# Patient Record
Sex: MALE | URBAN - METROPOLITAN AREA
[De-identification: names, ages, dates, MRNs, and addresses within clinical notes are randomized per-mention and may not be internally consistent; named-entity substitution may affect disease eponyms.]

---

## 2019-07-29 ENCOUNTER — OFFICE VISIT (OUTPATIENT)
Dept: URBAN - METROPOLITAN AREA CLINIC 11 | Facility: CLINIC | Age: 84
End: 2019-07-29
Payer: COMMERCIAL

## 2019-07-29 PROCEDURE — 99204 OFFICE O/P NEW MOD 45 MIN: CPT | Performed by: OPTOMETRIST

## 2019-07-29 ASSESSMENT — INTRAOCULAR PRESSURE
OD: 22
OS: 22

## 2019-07-29 ASSESSMENT — KERATOMETRY
OS: 43.50
OD: 43.00

## 2019-07-29 ASSESSMENT — VISUAL ACUITY
OS: 20/60
OD: 20/60

## 2019-08-05 ENCOUNTER — TESTING ONLY (OUTPATIENT)
Dept: URBAN - METROPOLITAN AREA CLINIC 45 | Facility: CLINIC | Age: 84
End: 2019-08-05
Payer: COMMERCIAL

## 2019-08-05 PROCEDURE — 92136 OPHTHALMIC BIOMETRY: CPT | Performed by: OPHTHALMOLOGY

## 2019-08-05 ASSESSMENT — PACHYMETRY
OS: 2.50
OD: 2.54
OS: 23.28
OD: 23.47

## 2019-08-06 ENCOUNTER — OFFICE VISIT (OUTPATIENT)
Dept: URBAN - METROPOLITAN AREA CLINIC 45 | Facility: CLINIC | Age: 84
End: 2019-08-06
Payer: COMMERCIAL

## 2019-08-06 DIAGNOSIS — H25.13 AGE-RELATED NUCLEAR CATARACT, BILATERAL: Primary | ICD-10-CM

## 2019-08-06 PROCEDURE — 76519 ECHO EXAM OF EYE: CPT | Performed by: OPHTHALMOLOGY

## 2019-08-06 PROCEDURE — 92004 COMPRE OPH EXAM NEW PT 1/>: CPT | Performed by: OPHTHALMOLOGY

## 2019-08-06 RX ORDER — OFLOXACIN 3 MG/ML
0.3 % SOLUTION/ DROPS OPHTHALMIC
Qty: 5 | Refills: 1 | Status: INACTIVE
Start: 2019-08-06 | End: 2019-08-13

## 2019-08-06 ASSESSMENT — INTRAOCULAR PRESSURE
OD: 21
OS: 21

## 2019-08-06 NOTE — IMPRESSION/PLAN
Impression: Age-related nuclear cataract, bilateral: H25.13. Plan: OK for ce/iol OD then OS in [place] 27, RL2. Distance target. Discussed diagnosis of cataracts. Cataracts are limiting vision. Discussed risks, benefits and alternatives to surgery including but not limited to: bleeding, infection, risk of vision loss, loss of the eye, need for other surgery. Patient voiced understanding and wishes to proceed.

## 2019-08-12 ENCOUNTER — SURGERY (OUTPATIENT)
Dept: URBAN - METROPOLITAN AREA SURGERY 20 | Facility: SURGERY | Age: 84
End: 2019-08-12
Payer: COMMERCIAL

## 2019-08-12 PROCEDURE — 66984 XCAPSL CTRC RMVL W/O ECP: CPT | Performed by: OPHTHALMOLOGY

## 2019-08-13 ENCOUNTER — POST-OPERATIVE VISIT (OUTPATIENT)
Dept: URBAN - METROPOLITAN AREA CLINIC 11 | Facility: CLINIC | Age: 84
End: 2019-08-13

## 2019-08-13 PROCEDURE — 99024 POSTOP FOLLOW-UP VISIT: CPT | Performed by: OPTOMETRIST

## 2019-08-13 RX ORDER — OFLOXACIN 3 MG/ML
0.3 % SOLUTION/ DROPS OPHTHALMIC
Qty: 5 | Refills: 1 | Status: ACTIVE
Start: 2019-08-13

## 2019-08-13 RX ORDER — DUREZOL 0.5 MG/ML
0.05 % EMULSION OPHTHALMIC
Qty: 1 | Refills: 1 | Status: INACTIVE
Start: 2019-08-13 | End: 2019-08-13

## 2019-08-13 RX ORDER — DUREZOL 0.5 MG/ML
0.05 % EMULSION OPHTHALMIC
Qty: 1 | Refills: 1 | Status: ACTIVE
Start: 2019-08-13

## 2019-08-13 ASSESSMENT — INTRAOCULAR PRESSURE
OD: 32
OS: 21

## 2019-08-19 ENCOUNTER — POST-OPERATIVE VISIT (OUTPATIENT)
Dept: URBAN - METROPOLITAN AREA CLINIC 11 | Facility: CLINIC | Age: 84
End: 2019-08-19

## 2019-08-19 PROCEDURE — 99024 POSTOP FOLLOW-UP VISIT: CPT | Performed by: OPTOMETRIST

## 2019-08-19 ASSESSMENT — INTRAOCULAR PRESSURE
OD: 17
OS: 19

## 2019-09-16 ENCOUNTER — POST-OPERATIVE VISIT (OUTPATIENT)
Dept: URBAN - METROPOLITAN AREA CLINIC 11 | Facility: CLINIC | Age: 84
End: 2019-09-16

## 2019-09-16 DIAGNOSIS — Z09 ENCNTR FOR F/U EXAM AFT TRTMT FOR COND OTH THAN MALIG NEOPLM: Primary | ICD-10-CM

## 2019-09-16 PROCEDURE — 99024 POSTOP FOLLOW-UP VISIT: CPT | Performed by: OPTOMETRIST

## 2019-09-16 ASSESSMENT — INTRAOCULAR PRESSURE
OD: 15
OS: 17

## 2019-09-16 ASSESSMENT — VISUAL ACUITY
OS: 20/150+1
OD: 20/50-1